# Patient Record
Sex: FEMALE | Employment: OTHER | URBAN - METROPOLITAN AREA
[De-identification: names, ages, dates, MRNs, and addresses within clinical notes are randomized per-mention and may not be internally consistent; named-entity substitution may affect disease eponyms.]

---

## 2023-08-03 ENCOUNTER — OFFICE VISIT (OUTPATIENT)
Dept: FAMILY MEDICINE CLINIC | Age: 63
End: 2023-08-03

## 2023-08-03 VITALS
BODY MASS INDEX: 27.98 KG/M2 | TEMPERATURE: 97.3 F | OXYGEN SATURATION: 94 % | SYSTOLIC BLOOD PRESSURE: 162 MMHG | HEART RATE: 95 BPM | HEIGHT: 64 IN | DIASTOLIC BLOOD PRESSURE: 100 MMHG | WEIGHT: 163.9 LBS

## 2023-08-03 DIAGNOSIS — Z00.00 ENCOUNTER FOR WELLNESS EXAMINATION IN ADULT: ICD-10-CM

## 2023-08-03 DIAGNOSIS — I50.42 CHRONIC COMBINED SYSTOLIC AND DIASTOLIC CONGESTIVE HEART FAILURE (HCC): ICD-10-CM

## 2023-08-03 DIAGNOSIS — Z23 NEED FOR PNEUMOCOCCAL VACCINE: Primary | ICD-10-CM

## 2023-08-03 PROBLEM — Z95.5 PRESENCE OF DRUG-ELUTING STENT IN ANTERIOR DESCENDING BRANCH OF LEFT CORONARY ARTERY: Chronic | Status: ACTIVE | Noted: 2023-02-27

## 2023-08-03 PROBLEM — I25.10 3-VESSEL CORONARY ARTERY DISEASE: Status: ACTIVE | Noted: 2023-02-27

## 2023-08-03 PROBLEM — Z95.5 PRESENCE OF DRUG-ELUTING STENT IN ANTERIOR DESCENDING BRANCH OF LEFT CORONARY ARTERY: Status: ACTIVE | Noted: 2023-08-03

## 2023-08-03 PROBLEM — I25.10 3-VESSEL CORONARY ARTERY DISEASE: Status: ACTIVE | Noted: 2023-08-03

## 2023-08-03 PROCEDURE — 90471 IMMUNIZATION ADMIN: CPT | Performed by: NURSE PRACTITIONER

## 2023-08-03 PROCEDURE — 90677 PCV20 VACCINE IM: CPT | Performed by: NURSE PRACTITIONER

## 2023-08-03 PROCEDURE — 99204 OFFICE O/P NEW MOD 45 MIN: CPT | Performed by: NURSE PRACTITIONER

## 2023-08-03 RX ORDER — ISOSORBIDE DINITRATE 20 MG/1
40 TABLET ORAL 3 TIMES DAILY
COMMUNITY

## 2023-08-03 RX ORDER — ATORVASTATIN CALCIUM 80 MG/1
80 TABLET, FILM COATED ORAL DAILY
COMMUNITY

## 2023-08-03 RX ORDER — TORSEMIDE 20 MG/1
20 TABLET ORAL DAILY
COMMUNITY

## 2023-08-03 RX ORDER — COLCHICINE 0.6 MG/1
0.6 TABLET ORAL DAILY
COMMUNITY

## 2023-08-03 RX ORDER — HYDRALAZINE HYDROCHLORIDE 50 MG/1
75 TABLET, FILM COATED ORAL 3 TIMES DAILY
COMMUNITY

## 2023-08-03 RX ORDER — CLOPIDOGREL BISULFATE 75 MG/1
75 TABLET ORAL DAILY
COMMUNITY

## 2023-08-03 RX ORDER — PANTOPRAZOLE SODIUM 40 MG/1
40 FOR SUSPENSION ORAL
COMMUNITY

## 2023-08-03 RX ORDER — POTASSIUM CITRATE 10 MEQ/1
TABLET, EXTENDED RELEASE ORAL DAILY
COMMUNITY

## 2023-08-03 RX ORDER — METOPROLOL SUCCINATE 50 MG/1
50 TABLET, EXTENDED RELEASE ORAL DAILY
COMMUNITY

## 2023-08-03 RX ORDER — ASPIRIN 81 MG/1
81 TABLET, CHEWABLE ORAL DAILY
COMMUNITY

## 2023-08-03 RX ORDER — FOLIC ACID 1 MG/1
1 TABLET ORAL DAILY
COMMUNITY

## 2023-08-03 SDOH — ECONOMIC STABILITY: INCOME INSECURITY: HOW HARD IS IT FOR YOU TO PAY FOR THE VERY BASICS LIKE FOOD, HOUSING, MEDICAL CARE, AND HEATING?: SOMEWHAT HARD

## 2023-08-03 SDOH — ECONOMIC STABILITY: FOOD INSECURITY: WITHIN THE PAST 12 MONTHS, YOU WORRIED THAT YOUR FOOD WOULD RUN OUT BEFORE YOU GOT MONEY TO BUY MORE.: NEVER TRUE

## 2023-08-03 SDOH — ECONOMIC STABILITY: HOUSING INSECURITY
IN THE LAST 12 MONTHS, WAS THERE A TIME WHEN YOU DID NOT HAVE A STEADY PLACE TO SLEEP OR SLEPT IN A SHELTER (INCLUDING NOW)?: NO

## 2023-08-03 SDOH — ECONOMIC STABILITY: FOOD INSECURITY: WITHIN THE PAST 12 MONTHS, THE FOOD YOU BOUGHT JUST DIDN'T LAST AND YOU DIDN'T HAVE MONEY TO GET MORE.: NEVER TRUE

## 2023-08-03 ASSESSMENT — ENCOUNTER SYMPTOMS
EYE REDNESS: 0
WHEEZING: 0
VOMITING: 0
NAUSEA: 0
COLOR CHANGE: 0
CHEST TIGHTNESS: 0
COUGH: 0
CONSTIPATION: 0
SHORTNESS OF BREATH: 0
BACK PAIN: 0
BLOOD IN STOOL: 0
DIARRHEA: 0
ABDOMINAL PAIN: 0
APNEA: 0
TROUBLE SWALLOWING: 0

## 2023-08-03 ASSESSMENT — PATIENT HEALTH QUESTIONNAIRE - PHQ9
SUM OF ALL RESPONSES TO PHQ9 QUESTIONS 1 & 2: 2
SUM OF ALL RESPONSES TO PHQ QUESTIONS 1-9: 2
2. FEELING DOWN, DEPRESSED OR HOPELESS: 1
SUM OF ALL RESPONSES TO PHQ QUESTIONS 1-9: 2
SUM OF ALL RESPONSES TO PHQ QUESTIONS 1-9: 2
1. LITTLE INTEREST OR PLEASURE IN DOING THINGS: 1
SUM OF ALL RESPONSES TO PHQ QUESTIONS 1-9: 2

## 2023-08-03 NOTE — PATIENT INSTRUCTIONS
You should be hearing from [facility] coordinator for help with resources  Take your medicines as cardiologist recommends  Follow up in 3 months or sooner if llness occurs

## 2023-08-03 NOTE — PROGRESS NOTES
Carlos Antunez (:  1960) is a 57 y.o. female,New patient, here for evaluation of the following chief complaint(s):  New Patient, Follow-up (PT WAS DISCHARGED ON 2023 FROM HOSP//Pt had a bad fall, she was at the hosp for at least 5 days), Foot Swelling, and Other (Pt has been light headed and trouble breathing )      ASSESSMENT/PLAN:  1. Need for pneumococcal vaccine  -     Pneumococcal, PCV20, PREVNAR 20, (age 25 yrs+), IM, PF  2. Chronic combined systolic and diastolic congestive heart failure ([address])  Assessment & Plan:   Monitored by specialist- no acute findings meriting change in the plan, bp elevated in office, pt held morning medications, reviewed medications, recommended to take when she gets home, has cards follow up   3. Encounter for wellness examination in adult  Assessment & Plan:   continue cardiology follow up and plan, updated prevnar vaccine today, reviewed medications with patient, reviewed preventive screenings, awaiting insurance, care coordinator consulted for resource eval      Patient Instructions   You should be hearing from [facility] our Care coordinator for help with resources  Take your medicines as cardiologist recommends  Follow up in 3 months or sooner if llness occurs     No follow-ups on file. SUBJECTIVE/OBJECTIVE:  Pt here to establish care with pcp  Had icu stay in 2023 after fall and found to be in resp failure, heart cath performed and stent placed. Sees cardiologist at [facility] who has been monitoring health.  Had ABL anemia and JONA with hospitalizations, most recent labs 23 per cards, with follow up on     Pt reports some shortness of breath this am which has resolved currently, weight 163, previously 161.12oz on 23    Foot pain: has chronic bilateral swelling since hospitalization, reports improving, uric acid level elevated cardiology started on colchicine 2 days ago    Hypokalemia: managed by cards    CKD: trending up, cards

## 2023-08-03 NOTE — ASSESSMENT & PLAN NOTE
continue cardiology follow up and plan, updated prevnar vaccine today, reviewed medications with patient, reviewed preventive screenings, awaiting insurance, care coordinator consulted for resource eval

## 2023-08-03 NOTE — ASSESSMENT & PLAN NOTE
Monitored by specialist- no acute findings meriting change in the plan, bp elevated in office, pt held morning medications, reviewed medications, recommended to take when she gets home, has cards follow up 8/7

## 2023-08-18 ENCOUNTER — CARE COORDINATION (OUTPATIENT)
Dept: CARE COORDINATION | Age: 63
End: 2023-08-18

## 2023-08-18 ASSESSMENT — SOCIAL DETERMINANTS OF HEALTH (SDOH): HOW HARD IS IT FOR YOU TO PAY FOR THE VERY BASICS LIKE FOOD, HOUSING, MEDICAL CARE, AND HEATING?: HARD

## 2023-08-18 NOTE — CARE COORDINATION
Coordination Episodes    Type: Amb Care Management  Episode: rising risk management  Noted: 8/18/2023

## 2023-08-22 ENCOUNTER — CARE COORDINATION (OUTPATIENT)
Dept: CARE COORDINATION | Age: 63
End: 2023-08-22

## 2023-08-22 NOTE — CARE COORDINATION
ACM/RN made outreach today and spoke to Lady Trujillo following message received,     [facility],   Yes, please feel free to give her my phone number. It's [phone]. Have a good day! Madina\"    Covering ACM provided  Loren Castillo, LSW contact to pt. Lady Trujillo verbalized understanding. Routed updates today to LSW as well. Primary ACM next outreach in two weeks. Cindy APPIAH, RN  Ambulatory Care Manager  [email or web address]  [phone]

## 2023-08-24 ENCOUNTER — TELEPHONE (OUTPATIENT)
Dept: OTHER | Age: 63
End: 2023-08-24

## 2023-08-24 NOTE — TELEPHONE ENCOUNTER
Pt was referred to [facility] by Care Coordination. SW left voicemail message asking Pt to return call.

## 2023-08-29 ENCOUNTER — COMMUNITY OUTREACH (OUTPATIENT)
Dept: OTHER | Age: 63
End: 2023-08-29

## 2023-08-29 NOTE — PROGRESS NOTES
[facility] spoke by phone with Pt this date. Pt had been referred to [facility] by Care Coordination. Pt is currently without health insurance. Based on her stated income, she is not eligible for Medicaid. However, Pt will be eligible for Medicare when she turns 63 this December. Pt indicated she has not begun exploring Medicare options and is uncertain how to start that process. LIONEL provided Pt with contact information for [facility] [employer] and [facility], two unbiased programs that help individuals decide on Medicare plans that best serve their needs. Additionally, Pt has one outstanding bill with [facility] ([facility] Pt meets income eligibility for [facility] financial assistance and [facility] emailed to Pt a copy of the application with instructions on needed income verification. Pt has a larger medical bill from another hospital system, and has started that financial assistance application process as well. [facility] encouraged Pt to reach back out if in need of additional help.